# Patient Record
Sex: MALE | Race: WHITE | Employment: FULL TIME | URBAN - METROPOLITAN AREA
[De-identification: names, ages, dates, MRNs, and addresses within clinical notes are randomized per-mention and may not be internally consistent; named-entity substitution may affect disease eponyms.]

---

## 2018-08-10 ENCOUNTER — APPOINTMENT (OUTPATIENT)
Dept: GENERAL RADIOLOGY | Age: 28
End: 2018-08-10
Attending: STUDENT IN AN ORGANIZED HEALTH CARE EDUCATION/TRAINING PROGRAM
Payer: OTHER MISCELLANEOUS

## 2018-08-10 ENCOUNTER — HOSPITAL ENCOUNTER (EMERGENCY)
Age: 28
Discharge: HOME | End: 2018-08-10
Attending: STUDENT IN AN ORGANIZED HEALTH CARE EDUCATION/TRAINING PROGRAM
Payer: OTHER MISCELLANEOUS

## 2018-08-10 VITALS
BODY MASS INDEX: 30.51 KG/M2 | WEIGHT: 230.2 LBS | HEIGHT: 73 IN | OXYGEN SATURATION: 98 % | TEMPERATURE: 98 F | HEART RATE: 68 BPM | RESPIRATION RATE: 18 BRPM | DIASTOLIC BLOOD PRESSURE: 74 MMHG | SYSTOLIC BLOOD PRESSURE: 144 MMHG

## 2018-08-10 DIAGNOSIS — T30.4 BURNS BY, CHEMICAL: Primary | ICD-10-CM

## 2018-08-10 PROCEDURE — 99283 EMERGENCY DEPT VISIT LOW MDM: CPT | Performed by: STUDENT IN AN ORGANIZED HEALTH CARE EDUCATION/TRAINING PROGRAM

## 2018-08-10 PROCEDURE — 74011250637 HC RX REV CODE- 250/637: Performed by: STUDENT IN AN ORGANIZED HEALTH CARE EDUCATION/TRAINING PROGRAM

## 2018-08-10 PROCEDURE — 73090 X-RAY EXAM OF FOREARM: CPT

## 2018-08-10 PROCEDURE — 75810000057 HC BURN CR DRS/DEB SM<5%TBSA: Performed by: STUDENT IN AN ORGANIZED HEALTH CARE EDUCATION/TRAINING PROGRAM

## 2018-08-10 PROCEDURE — 73502 X-RAY EXAM HIP UNI 2-3 VIEWS: CPT

## 2018-08-10 PROCEDURE — 99284 EMERGENCY DEPT VISIT MOD MDM: CPT | Performed by: STUDENT IN AN ORGANIZED HEALTH CARE EDUCATION/TRAINING PROGRAM

## 2018-08-10 RX ORDER — HYDROCODONE BITARTRATE AND ACETAMINOPHEN 5; 325 MG/1; MG/1
1 TABLET ORAL
Qty: 10 TAB | Refills: 0 | Status: SHIPPED | OUTPATIENT
Start: 2018-08-10 | End: 2018-10-03

## 2018-08-10 RX ORDER — HYDROCODONE BITARTRATE AND ACETAMINOPHEN 7.5; 325 MG/1; MG/1
1 TABLET ORAL
Status: COMPLETED | OUTPATIENT
Start: 2018-08-10 | End: 2018-08-10

## 2018-08-10 RX ORDER — ONDANSETRON 2 MG/ML
4 INJECTION INTRAMUSCULAR; INTRAVENOUS
Status: DISCONTINUED | OUTPATIENT
Start: 2018-08-10 | End: 2018-08-10 | Stop reason: HOSPADM

## 2018-08-10 RX ORDER — SILVER SULFADIAZINE 10 G/1000G
CREAM TOPICAL
Status: DISCONTINUED | OUTPATIENT
Start: 2018-08-10 | End: 2018-08-10 | Stop reason: HOSPADM

## 2018-08-10 RX ORDER — HYDROMORPHONE HYDROCHLORIDE 2 MG/ML
1 INJECTION, SOLUTION INTRAMUSCULAR; INTRAVENOUS; SUBCUTANEOUS
Status: DISCONTINUED | OUTPATIENT
Start: 2018-08-10 | End: 2018-08-10

## 2018-08-10 RX ORDER — SILVER SULFADIAZINE 10 G/1000G
CREAM TOPICAL 2 TIMES DAILY
Qty: 50 G | Refills: 0 | Status: SHIPPED | OUTPATIENT
Start: 2018-08-10 | End: 2018-08-20

## 2018-08-10 RX ADMIN — HYDROCODONE BITARTRATE AND ACETAMINOPHEN 1 TABLET: 7.5; 325 TABLET ORAL at 05:50
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
## 2018-08-10 NOTE — ED NOTES
I have reviewed discharge instructions with the patient. The patient verbalized understanding. Patient left ED via Discharge Method: ambulatory to Home with (insert name of family/friend, self, transport girlfriend). Opportunity for questions and clarification provided. Patient given 2 scripts. To continue your aftercare when you leave the hospital, you may receive an automated call from our care team to check in on how you are doing. This is a free service and part of our promise to provide the best care and service to meet your aftercare needs.  If you have questions, or wish to unsubscribe from this service please call [phone]. Thank you for Choosing our [facility] Emergency Department.

## 2018-08-10 NOTE — DISCHARGE INSTRUCTIONS
Chemical Tam: Care Instructions  Your Care Instructions    Heidi Murray can occur when a harmful chemical,such as a cleaning product or an acid, splashes onto the skin. The amount of damage to the skin depends on how strong the chemical was, how much of it was on the skin, and how long it was there. Chemical burns, even minor ones, can be very painful. A minor burn may heal within a few days. But a more serious burn may take weeks or even months to heal completely. When the skin is damaged by a burn, it may become infected. You can help prevent infection and help your burn heal. Keep the burn clean, and change the bandages often. Taking good care of the burn as it heals may help prevent bad scars. The treatment for most chemical burns is to remove the chemical from the skin by flushing the area with plenty of water. But some chemicals can't be removed with water. They may need to be removed from the skin in other ways by the doctor. The doctor has checked your skin carefully, but problems can develop later. If you notice any problems or new symptoms, get medical treatment right away. Follow-up care is a key part of your treatment and safety. Be sure to make and go to all appointments, and call your doctor if you are having problems. It's also a good idea to know your test results and keep a list of the medicines you take. How can you care for yourself at home? · If your doctor told you how to care for your burn, follow your doctor's instructions. If you did not get instructions, follow this general advice:  ¨ Wash the burn with clean water 2 times a day. Don't use hydrogen peroxide or alcohol, which can slow healing. ¨ Gently pat the burn dry after you wash it. ¨ You may cover the burn with a thin layer of antibiotic ointment, such as bacitracin, and a nonstick bandage. ¨ Apply more ointment and replace the bandage as needed. · Be safe with medicines. Read and follow all instructions on the label.   ¨ If the doctor gave you a prescription medicine for pain, take it as prescribed. ¨ If you are not taking a prescription pain medicine, ask your doctor if you can take an over-the-counter medicine. · Don't break blisters open. Broken blisters could get infected. If a blister breaks open by itself, blot up the liquid, and leave the skin that covered the blister. This helps protect the new skin. · Try not to scratch the burn. Talk to your doctor about what to use on the burn for itching. When should you call for help? Call your doctor now or seek immediate medical care if:    · Your pain gets worse.     · You have symptoms of infection, such as:  ¨ Increased pain, swelling, warmth, or redness. ¨ Red streaks leading from the burn. ¨ Pus draining from the burn. ¨ A fever.    Watch closely for changes in your health, and be sure to contact your doctor if:    · The burn is not getting better each day. Where can you learn more? Go to http://leyda-wendy.info/. Enter M105 in the search box to learn more about \"Chemical Burns: Care Instructions. \"  Current as of: November 20, 2017  Content Version: 11.7  © 0673-2680 [employer], [employer]. Care instructions adapted under license by [employer] (which disclaims liability or warranty for this information). If you have questions about a medical condition or this instruction, always ask your healthcare professional. [place] [address] any warranty or liability for your use of this information.

## 2018-08-10 NOTE — ED NOTES
Calls to [employer] [employer] center patient does not want to go tonight.   Recommend irriagation

## 2018-08-10 NOTE — Clinical Note
Return in 24 hours for wound recheck and reevaluation. Arrange follow-up care as discussed with the specialists listed. Return sooner for worsening symptoms, concerns or questions.

## 2018-08-11 ENCOUNTER — HOSPITAL ENCOUNTER (EMERGENCY)
Age: 28
Discharge: HOME | End: 2018-08-11
Attending: EMERGENCY MEDICINE
Payer: OTHER MISCELLANEOUS

## 2018-08-11 VITALS
WEIGHT: 225 LBS | HEIGHT: 73 IN | BODY MASS INDEX: 29.82 KG/M2 | TEMPERATURE: 98.2 F | DIASTOLIC BLOOD PRESSURE: 97 MMHG | HEART RATE: 79 BPM | OXYGEN SATURATION: 99 % | RESPIRATION RATE: 16 BRPM | SYSTOLIC BLOOD PRESSURE: 146 MMHG

## 2018-08-11 DIAGNOSIS — T30.4 BURNS BY, CHEMICAL: Primary | ICD-10-CM

## 2018-08-11 PROCEDURE — 99281 EMR DPT VST MAYX REQ PHY/QHP: CPT | Performed by: NURSE PRACTITIONER

## 2018-08-11 RX ORDER — SILVER SULFADIAZINE 10 G/1000G
CREAM TOPICAL
Status: DISCONTINUED | OUTPATIENT
Start: 2018-08-11 | End: 2018-08-11 | Stop reason: HOSPADM
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
## 2018-08-11 NOTE — DISCHARGE INSTRUCTIONS
Chemical Tam: Care Instructions  Your Care Instructions    Twan Anchors can occur when a harmful chemical,such as a cleaning product or an acid, splashes onto the skin. The amount of damage to the skin depends on how strong the chemical was, how much of it was on the skin, and how long it was there. Chemical burns, even minor ones, can be very painful. A minor burn may heal within a few days. But a more serious burn may take weeks or even months to heal completely. When the skin is damaged by a burn, it may become infected. You can help prevent infection and help your burn heal. Keep the burn clean, and change the bandages often. Taking good care of the burn as it heals may help prevent bad scars. The treatment for most chemical burns is to remove the chemical from the skin by flushing the area with plenty of water. But some chemicals can't be removed with water. They may need to be removed from the skin in other ways by the doctor. The doctor has checked your skin carefully, but problems can develop later. If you notice any problems or new symptoms, get medical treatment right away. Follow-up care is a key part of your treatment and safety. Be sure to make and go to all appointments, and call your doctor if you are having problems. It's also a good idea to know your test results and keep a list of the medicines you take. How can you care for yourself at home? · If your doctor told you how to care for your burn, follow your doctor's instructions. If you did not get instructions, follow this general advice:  ¨ Wash the burn with clean water 2 times a day. Don't use hydrogen peroxide or alcohol, which can slow healing. ¨ Gently pat the burn dry after you wash it. ¨ You may cover the burn with a thin layer of antibiotic ointment, such as bacitracin, and a nonstick bandage. ¨ Apply more ointment and replace the bandage as needed. · Be safe with medicines. Read and follow all instructions on the label.   ¨ If the doctor gave you a prescription medicine for pain, take it as prescribed. ¨ If you are not taking a prescription pain medicine, ask your doctor if you can take an over-the-counter medicine. · Don't break blisters open. Broken blisters could get infected. If a blister breaks open by itself, blot up the liquid, and leave the skin that covered the blister. This helps protect the new skin. · Try not to scratch the burn. Talk to your doctor about what to use on the burn for itching. When should you call for help? Call your doctor now or seek immediate medical care if:    · Your pain gets worse.     · You have symptoms of infection, such as:  ¨ Increased pain, swelling, warmth, or redness. ¨ Red streaks leading from the burn. ¨ Pus draining from the burn. ¨ A fever.    Watch closely for changes in your health, and be sure to contact your doctor if:    · The burn is not getting better each day. Where can you learn more? Go to http://leyda-wendy.info/. Enter M105 in the search box to learn more about \"Chemical Burns: Care Instructions. \"  Current as of: November 20, 2017  Content Version: 11.7  © 1448-7402 [employer], [employer]. Care instructions adapted under license by [employer] (which disclaims liability or warranty for this information). If you have questions about a medical condition or this instruction, always ask your healthcare professional. [place] [address] any warranty or liability for your use of this information.

## 2022-05-31 ENCOUNTER — TELEPHONE (OUTPATIENT)
Dept: FAMILY MEDICINE CLINIC | Facility: CLINIC | Age: 32
End: 2022-05-31

## 2022-05-31 DIAGNOSIS — F32.9 REACTIVE DEPRESSION: Primary | ICD-10-CM

## 2022-05-31 RX ORDER — LAMOTRIGINE 150 MG/1
150 TABLET ORAL DAILY
Qty: 90 TABLET | Refills: 0 | Status: SHIPPED | OUTPATIENT
Start: 2022-05-31 | End: 2022-06-10 | Stop reason: SDUPTHER

## 2022-06-10 ENCOUNTER — OFFICE VISIT (OUTPATIENT)
Dept: FAMILY MEDICINE CLINIC | Facility: CLINIC | Age: 32
End: 2022-06-10
Payer: COMMERCIAL

## 2022-06-10 VITALS
HEART RATE: 59 BPM | OXYGEN SATURATION: 95 % | TEMPERATURE: 97.6 F | WEIGHT: 211 LBS | SYSTOLIC BLOOD PRESSURE: 122 MMHG | RESPIRATION RATE: 16 BRPM | HEIGHT: 73 IN | DIASTOLIC BLOOD PRESSURE: 80 MMHG | BODY MASS INDEX: 27.96 KG/M2

## 2022-06-10 DIAGNOSIS — F32.9 REACTIVE DEPRESSION: ICD-10-CM

## 2022-06-10 PROCEDURE — 99213 OFFICE O/P EST LOW 20 MIN: CPT | Performed by: NURSE PRACTITIONER

## 2022-06-10 RX ORDER — LAMOTRIGINE 150 MG/1
150 TABLET ORAL DAILY
Qty: 90 TABLET | Refills: 1 | Status: SHIPPED | OUTPATIENT
Start: 2022-06-10

## 2022-06-10 RX ORDER — SERTRALINE HYDROCHLORIDE 100 MG/1
200 TABLET, FILM COATED ORAL DAILY
Qty: 90 TABLET | Refills: 1 | Status: SHIPPED | OUTPATIENT
Start: 2022-06-10 | End: 2022-06-13 | Stop reason: SDUPTHER

## 2022-06-10 ASSESSMENT — PATIENT HEALTH QUESTIONNAIRE - PHQ9
9. THOUGHTS THAT YOU WOULD BE BETTER OFF DEAD, OR OF HURTING YOURSELF: 0
SUM OF ALL RESPONSES TO PHQ QUESTIONS 1-9: 1
7. TROUBLE CONCENTRATING ON THINGS, SUCH AS READING THE NEWSPAPER OR WATCHING TELEVISION: 0
5. POOR APPETITE OR OVEREATING: 0
10. IF YOU CHECKED OFF ANY PROBLEMS, HOW DIFFICULT HAVE THESE PROBLEMS MADE IT FOR YOU TO DO YOUR WORK, TAKE CARE OF THINGS AT HOME, OR GET ALONG WITH OTHER PEOPLE: 0
4. FEELING TIRED OR HAVING LITTLE ENERGY: 0
6. FEELING BAD ABOUT YOURSELF - OR THAT YOU ARE A FAILURE OR HAVE LET YOURSELF OR YOUR FAMILY DOWN: 0
8. MOVING OR SPEAKING SO SLOWLY THAT OTHER PEOPLE COULD HAVE NOTICED. OR THE OPPOSITE, BEING SO FIGETY OR RESTLESS THAT YOU HAVE BEEN MOVING AROUND A LOT MORE THAN USUAL: 0
SUM OF ALL RESPONSES TO PHQ QUESTIONS 1-9: 1
2. FEELING DOWN, DEPRESSED OR HOPELESS: 1
SUM OF ALL RESPONSES TO PHQ QUESTIONS 1-9: 1
SUM OF ALL RESPONSES TO PHQ QUESTIONS 1-9: 1

## 2022-06-10 ASSESSMENT — ENCOUNTER SYMPTOMS
BLOOD IN STOOL: 0
SHORTNESS OF BREATH: 0
CONSTIPATION: 0
RHINORRHEA: 0
COUGH: 0
WHEEZING: 0
DIARRHEA: 0
EYE DISCHARGE: 0
CHEST TIGHTNESS: 0
EYE PAIN: 0
VOMITING: 0
ABDOMINAL PAIN: 0

## 2022-06-10 NOTE — PATIENT INSTRUCTIONS
Patient Education        A Healthy Lifestyle: Care Instructions  Your Care Instructions     A healthy lifestyle can help you feel good, stay at a healthy weight, and have plenty of energy for both work and play. A healthy lifestyle is something youcan share with your whole family. A healthy lifestyle also can lower your risk for serious health problems, suchas high blood pressure, heart disease, and diabetes. You can follow a few steps listed below to improve your health and the healthof your family. Follow-up care is a key part of your treatment and safety. Be sure to make and go to all appointments, and call your doctor if you are having problems. It's also a good idea to know your test results and keep alist of the medicines you take. How can you care for yourself at home?  Do not eat too much sugar, fat, or fast foods. You can still have dessert and treats now and then. The goal is moderation.  Start small to improve your eating habits. Pay attention to portion sizes, drink less juice and soda pop, and eat more fruits and vegetables. ? Eat a healthy amount of food. A 3-ounce serving of meat, for example, is about the size of a deck of cards. Fill the rest of your plate with vegetables and whole grains. ? Limit the amount of soda and sports drinks you have every day. Drink more water when you are thirsty. ? Eat plenty of fruits and vegetables every day. Have an apple or some carrot sticks as an afternoon snack instead of a candy bar. Try to have fruits and/or vegetables at every meal.   Make exercise part of your daily routine. You may want to start with simple activities, such as walking, bicycling, or slow swimming. Try to be active 30 to 60 minutes every day. You do not need to do all 30 to 60 minutes all at once. For example, you can exercise 3 times a day for 10 or 20 minutes.  Moderate exercise is safe for most people, but it is always a good idea to talk to your doctor before starting an exercise program.   Keep moving. [place] the lawn, work in the garden, or Ferfics. Take the stairs instead of the elevator at work.  If you smoke, quit. People who smoke have an increased risk for heart attack, stroke, cancer, and other lung illnesses. Quitting is hard, but there are ways to boost your chance of quitting tobacco for good. ? Use nicotine gum, patches, or lozenges. ? Ask your doctor about stop-smoking programs and medicines. ? Keep trying. In addition to reducing your risk of diseases in the future, you will notice some benefits soon after you stop using tobacco. If you have shortness of breath or asthma symptoms, they will likely get better within a few weeks after you quit.  Limit how much alcohol you drink. Moderate amounts of alcohol (up to 2 drinks a day for men, 1 drink a day for women) are okay. But drinking too much can lead to liver problems, high blood pressure, and other health problems. Family health  If you have a family, there are many things you can do together to improve yourhealth.  Eat meals together as a family as often as possible.  Eat healthy foods. This includes fruits, vegetables, lean meats and dairy, and whole grains.  Include your family in your fitness plan. Most people think of activities such as jogging or tennis as the way to fitness, but there are many ways you and your family can be more active. Anything that makes you breathe hard and gets your heart pumping is exercise. Here are some tips:  ? Walk to do errands or to take your child to school or the bus.  ? Go for a family bike ride after dinner instead of watching TV. Where can you learn more? Go to https://Silent Circlemark.TransactionTree. org and sign in to your s0cket account. Enter L697 in the [facility] box to learn more about \"A Healthy Lifestyle: Care Instructions. \"     If you do not have an account, please click on the \"Sign Up Now\" link.   Current as of: June 16, 2021               Content Version: 13.2  © 6614-8324 [employer], [employer]. Care instructions adapted under license by [facility] ([facility]). If you have questions about a medical condition or this instruction, always ask your healthcare professional. [address] any warranty or liability for your use of this information.

## 2022-06-13 ENCOUNTER — TELEPHONE (OUTPATIENT)
Dept: FAMILY MEDICINE CLINIC | Facility: CLINIC | Age: 32
End: 2022-06-13

## 2022-06-13 DIAGNOSIS — F32.9 REACTIVE DEPRESSION: ICD-10-CM

## 2022-06-13 RX ORDER — SERTRALINE HYDROCHLORIDE 100 MG/1
200 TABLET, FILM COATED ORAL DAILY
Qty: 180 TABLET | Refills: 1 | Status: SHIPPED | OUTPATIENT
Start: 2022-06-13

## 2022-06-13 NOTE — TELEPHONE ENCOUNTER
[facility] called requesting clarification on Zoloft. She states script has two sets of directions, not sure if patient is to take 1 or 2 tabs a day. Requesting a new script to be sent.  Please advise

## 2022-07-07 ENCOUNTER — TELEPHONE (OUTPATIENT)
Dept: FAMILY MEDICINE CLINIC | Facility: CLINIC | Age: 32
End: 2022-07-07

## 2023-02-24 ENCOUNTER — TELEPHONE (OUTPATIENT)
Dept: FAMILY MEDICINE CLINIC | Facility: CLINIC | Age: 33
End: 2023-02-24

## 2023-02-24 DIAGNOSIS — F32.9 REACTIVE DEPRESSION: ICD-10-CM

## 2023-02-24 RX ORDER — SERTRALINE HYDROCHLORIDE 100 MG/1
200 TABLET, FILM COATED ORAL DAILY
Qty: 180 TABLET | Refills: 0 | Status: SHIPPED | OUTPATIENT
Start: 2023-02-24 | End: 2023-04-13 | Stop reason: SDUPTHER

## 2023-08-24 NOTE — PROGRESS NOTES
Marvel Sepulveda (:  1990) is a 34 y.o. male,Established patient, here for evaluation of the following chief complaint(s):  Medication Refill         ASSESSMENT/PLAN:  1. Reactive depression  -     lamoTRIgine (LAMICTAL) 150 MG tablet; Take 1 tablet by mouth daily, Disp-90 tablet, R-1Normal  -     sertraline (ZOLOFT) 100 MG tablet; Take 2 tablets by mouth daily TAKE ONE TABLET BY MOUTH ONE TIME DAILY, Disp-90 tablet, R-1Normal  -     External Referral to Psychiatry  Increased Zoloft to 200 mg per day. Monitor symptoms. Patient wishes to see counselor to discuss his issues and see if counseling will help him come off of his meds. Anticipatory guidance for age-seatbelts, avoid cell phone use in car (may use hands free), no texting while driving, avoid social drugs and tobacco, limit alcohol, fall safety, personal safety. Encourage healthy diet and exercise daily. Return in about 6 months (around 12/10/2022) for medication refills. Subjective   SUBJECTIVE/OBJECTIVE:  Is doing well on his medications. Was on Zoloft 200 mg but was decreased to 100 when the script was filled for one a day instead of 2. Feels the 200 mg dosage worked better for him. Was seeing a counselor thru Dr. Sy Watts office but was dismissed when he missed too many appointments. A new referral was placed last year but patient never received a call back from them. He wishes to do some counseling as he does not ant to be on medications forever. Feels his mood stabilizer is working well for him. Mental Health Problem  The primary symptoms include dysphoric mood. This is a chronic problem. The onset of the illness is precipitated by emotional stress. The degree of incapacity that he is experiencing as a consequence of his illness is moderate. Additional symptoms of the illness include feelings of worthlessness and distractible.  Additional symptoms of the illness do not include fatigue, headaches, atraumatic. Right Ear: Tympanic membrane, ear canal and external ear normal.      Left Ear: Tympanic membrane, ear canal and external ear normal.      Nose: Nose normal.      Mouth/Throat:      Mouth: Mucous membranes are moist.   Eyes:      Extraocular Movements: Extraocular movements intact. Conjunctiva/sclera: Conjunctivae normal.      Pupils: Pupils are equal, round, and reactive to light. Cardiovascular:      Rate and Rhythm: Normal rate and regular rhythm. Pulses: Normal pulses. Heart sounds: Normal heart sounds. No murmur heard. Pulmonary:      Effort: Pulmonary effort is normal.      Breath sounds: Normal breath sounds. Abdominal:      General: Bowel sounds are normal.   Musculoskeletal:         General: Normal range of motion. Cervical back: Normal range of motion. Skin:     General: Skin is warm and dry. Neurological:      General: No focal deficit present. Mental Status: He is alert and oriented to person, place, and time. Psychiatric:         Mood and Affect: Mood normal.         Behavior: Behavior normal.         Thought Content: Thought content normal.         Judgment: Judgment normal.           PHQ-9 Total Score: 1 (6/10/2022 11:16 AM)  Thoughts that you would be better off dead, or of hurting yourself in some way: 0 (6/10/2022 11:16 AM)        On this date 6/10/2022 I have spent 25 minutes reviewing previous notes, test results and face to face with the patient discussing the diagnosis and importance of compliance with the treatment plan as well as documenting on the day of the visit. An electronic signature was used to authenticate this note.     --MANJINDER Garibay - KALLI 24-Aug-2023 17:00

## 2023-11-13 DIAGNOSIS — F32.9 REACTIVE DEPRESSION: ICD-10-CM

## 2023-11-13 RX ORDER — LAMOTRIGINE 200 MG/1
200 TABLET ORAL DAILY
Qty: 90 TABLET | Refills: 0 | Status: SHIPPED | OUTPATIENT
Start: 2023-11-13 | End: 2023-11-30 | Stop reason: SDUPTHER

## 2023-11-13 NOTE — TELEPHONE ENCOUNTER
Pt called nad set an appt for 11/20 to get med refills but is requesting a refill on Lamictal until his appt.

## 2023-11-30 ENCOUNTER — OFFICE VISIT (OUTPATIENT)
Dept: FAMILY MEDICINE CLINIC | Facility: CLINIC | Age: 33
End: 2023-11-30
Payer: COMMERCIAL

## 2023-11-30 VITALS
WEIGHT: 232 LBS | HEART RATE: 62 BPM | RESPIRATION RATE: 18 BRPM | OXYGEN SATURATION: 97 % | TEMPERATURE: 97.2 F | HEIGHT: 73 IN | BODY MASS INDEX: 30.75 KG/M2 | SYSTOLIC BLOOD PRESSURE: 124 MMHG | DIASTOLIC BLOOD PRESSURE: 82 MMHG

## 2023-11-30 DIAGNOSIS — F32.9 REACTIVE DEPRESSION: ICD-10-CM

## 2023-11-30 PROCEDURE — 99213 OFFICE O/P EST LOW 20 MIN: CPT | Performed by: NURSE PRACTITIONER

## 2023-11-30 RX ORDER — SERTRALINE HYDROCHLORIDE 100 MG/1
200 TABLET, FILM COATED ORAL DAILY
Qty: 180 TABLET | Refills: 1 | Status: SHIPPED | OUTPATIENT
Start: 2023-11-30

## 2023-11-30 RX ORDER — LAMOTRIGINE 200 MG/1
200 TABLET ORAL DAILY
Qty: 90 TABLET | Refills: 1 | Status: SHIPPED | OUTPATIENT
Start: 2023-11-30

## 2023-11-30 SDOH — ECONOMIC STABILITY: INCOME INSECURITY: HOW HARD IS IT FOR YOU TO PAY FOR THE VERY BASICS LIKE FOOD, HOUSING, MEDICAL CARE, AND HEATING?: NOT HARD AT ALL

## 2023-11-30 SDOH — ECONOMIC STABILITY: FOOD INSECURITY: WITHIN THE PAST 12 MONTHS, THE FOOD YOU BOUGHT JUST DIDN'T LAST AND YOU DIDN'T HAVE MONEY TO GET MORE.: NEVER TRUE

## 2023-11-30 SDOH — ECONOMIC STABILITY: FOOD INSECURITY: WITHIN THE PAST 12 MONTHS, YOU WORRIED THAT YOUR FOOD WOULD RUN OUT BEFORE YOU GOT MONEY TO BUY MORE.: NEVER TRUE

## 2023-11-30 SDOH — ECONOMIC STABILITY: HOUSING INSECURITY
IN THE LAST 12 MONTHS, WAS THERE A TIME WHEN YOU DID NOT HAVE A STEADY PLACE TO SLEEP OR SLEPT IN A SHELTER (INCLUDING NOW)?: NO

## 2023-11-30 ASSESSMENT — ENCOUNTER SYMPTOMS
CONSTIPATION: 0
DIARRHEA: 0
ABDOMINAL PAIN: 0
CHEST TIGHTNESS: 0
RHINORRHEA: 0
SHORTNESS OF BREATH: 0
BLOOD IN STOOL: 0
COUGH: 0
EYE PAIN: 0
WHEEZING: 0
VOMITING: 0
EYE DISCHARGE: 0

## 2023-11-30 ASSESSMENT — PATIENT HEALTH QUESTIONNAIRE - PHQ9
2. FEELING DOWN, DEPRESSED OR HOPELESS: 0
SUM OF ALL RESPONSES TO PHQ9 QUESTIONS 1 & 2: 0
SUM OF ALL RESPONSES TO PHQ QUESTIONS 1-9: 0
1. LITTLE INTEREST OR PLEASURE IN DOING THINGS: 0

## 2024-07-08 ENCOUNTER — TELEPHONE (OUTPATIENT)
Dept: FAMILY MEDICINE CLINIC | Facility: CLINIC | Age: 34
End: 2024-07-08

## 2024-07-08 DIAGNOSIS — F32.9 REACTIVE DEPRESSION: ICD-10-CM

## 2024-07-08 RX ORDER — SERTRALINE HYDROCHLORIDE 100 MG/1
200 TABLET, FILM COATED ORAL DAILY
Qty: 180 TABLET | Refills: 0 | Status: SHIPPED | OUTPATIENT
Start: 2024-07-08

## 2024-09-04 ENCOUNTER — TELEPHONE (OUTPATIENT)
Dept: FAMILY MEDICINE CLINIC | Facility: CLINIC | Age: 34
End: 2024-09-04

## 2024-09-04 DIAGNOSIS — F32.9 REACTIVE DEPRESSION: ICD-10-CM

## 2024-09-04 RX ORDER — LAMOTRIGINE 200 MG/1
200 TABLET ORAL DAILY
Qty: 90 TABLET | Refills: 0 | Status: SHIPPED | OUTPATIENT
Start: 2024-09-04

## 2024-10-24 ENCOUNTER — TELEPHONE (OUTPATIENT)
Dept: FAMILY MEDICINE CLINIC | Facility: CLINIC | Age: 34
End: 2024-10-24

## 2024-10-24 DIAGNOSIS — F32.9 REACTIVE DEPRESSION: ICD-10-CM

## 2024-10-24 RX ORDER — SERTRALINE HYDROCHLORIDE 100 MG/1
200 TABLET, FILM COATED ORAL DAILY
Qty: 180 TABLET | Refills: 0 | Status: SHIPPED | OUTPATIENT
Start: 2024-10-24 | End: 2024-11-21 | Stop reason: SDUPTHER

## 2024-10-24 NOTE — TELEPHONE ENCOUNTER
A 90 day script for sertraline sent to [facility].  Has appointment scheduled for November 21.  Need to keep this appointment.  Kimberly Wheeler

## 2024-10-24 NOTE — TELEPHONE ENCOUNTER
Patient states needs refill on Sertraline sent to [facility], pt number is . States he has left messages regarding this though

## 2024-11-21 ENCOUNTER — OFFICE VISIT (OUTPATIENT)
Dept: FAMILY MEDICINE CLINIC | Facility: CLINIC | Age: 34
End: 2024-11-21
Payer: COMMERCIAL

## 2024-11-21 VITALS
DIASTOLIC BLOOD PRESSURE: 80 MMHG | HEIGHT: 73 IN | OXYGEN SATURATION: 97 % | RESPIRATION RATE: 18 BRPM | HEART RATE: 74 BPM | TEMPERATURE: 97.2 F | WEIGHT: 234 LBS | BODY MASS INDEX: 31.01 KG/M2 | SYSTOLIC BLOOD PRESSURE: 122 MMHG

## 2024-11-21 DIAGNOSIS — F32.9 REACTIVE DEPRESSION: ICD-10-CM

## 2024-11-21 PROCEDURE — 99213 OFFICE O/P EST LOW 20 MIN: CPT | Performed by: NURSE PRACTITIONER

## 2024-11-21 RX ORDER — SERTRALINE HYDROCHLORIDE 100 MG/1
200 TABLET, FILM COATED ORAL DAILY
Qty: 180 TABLET | Refills: 3 | Status: SHIPPED | OUTPATIENT
Start: 2024-11-21

## 2024-11-21 RX ORDER — LAMOTRIGINE 200 MG/1
200 TABLET ORAL DAILY
Qty: 90 TABLET | Refills: 3 | Status: SHIPPED | OUTPATIENT
Start: 2024-11-21

## 2024-11-21 SDOH — ECONOMIC STABILITY: FOOD INSECURITY: WITHIN THE PAST 12 MONTHS, THE FOOD YOU BOUGHT JUST DIDN'T LAST AND YOU DIDN'T HAVE MONEY TO GET MORE.: NEVER TRUE

## 2024-11-21 SDOH — ECONOMIC STABILITY: FOOD INSECURITY: WITHIN THE PAST 12 MONTHS, YOU WORRIED THAT YOUR FOOD WOULD RUN OUT BEFORE YOU GOT MONEY TO BUY MORE.: NEVER TRUE

## 2024-11-21 SDOH — ECONOMIC STABILITY: INCOME INSECURITY: HOW HARD IS IT FOR YOU TO PAY FOR THE VERY BASICS LIKE FOOD, HOUSING, MEDICAL CARE, AND HEATING?: NOT HARD AT ALL

## 2024-11-21 ASSESSMENT — ENCOUNTER SYMPTOMS
EYE DISCHARGE: 0
RHINORRHEA: 0
DIARRHEA: 0
EYE PAIN: 0
SHORTNESS OF BREATH: 0
CONSTIPATION: 0
VOMITING: 0
COUGH: 0
BLOOD IN STOOL: 0
CHEST TIGHTNESS: 0
ABDOMINAL PAIN: 0
WHEEZING: 0

## 2024-11-21 ASSESSMENT — PATIENT HEALTH QUESTIONNAIRE - PHQ9
SUM OF ALL RESPONSES TO PHQ QUESTIONS 1-9: 0
2. FEELING DOWN, DEPRESSED OR HOPELESS: NOT AT ALL
SUM OF ALL RESPONSES TO PHQ9 QUESTIONS 1 & 2: 0
1. LITTLE INTEREST OR PLEASURE IN DOING THINGS: NOT AT ALL

## 2025-05-15 ENCOUNTER — TELEPHONE (OUTPATIENT)
Dept: FAMILY MEDICINE CLINIC | Facility: CLINIC | Age: 35
End: 2025-05-15

## 2025-06-18 ENCOUNTER — OFFICE VISIT (OUTPATIENT)
Dept: FAMILY MEDICINE CLINIC | Facility: CLINIC | Age: 35
End: 2025-06-18
Payer: COMMERCIAL

## 2025-06-18 VITALS
HEIGHT: 73 IN | BODY MASS INDEX: 30.88 KG/M2 | TEMPERATURE: 97 F | DIASTOLIC BLOOD PRESSURE: 102 MMHG | RESPIRATION RATE: 18 BRPM | OXYGEN SATURATION: 98 % | SYSTOLIC BLOOD PRESSURE: 148 MMHG | WEIGHT: 233 LBS | HEART RATE: 68 BPM

## 2025-06-18 DIAGNOSIS — Z00.00 ROUTINE GENERAL MEDICAL EXAMINATION AT A HEALTH CARE FACILITY: Primary | ICD-10-CM

## 2025-06-18 DIAGNOSIS — F32.9 REACTIVE DEPRESSION: ICD-10-CM

## 2025-06-18 DIAGNOSIS — Z13.1 SCREENING FOR DIABETES MELLITUS: ICD-10-CM

## 2025-06-18 DIAGNOSIS — Z72.0 TOBACCO USE: ICD-10-CM

## 2025-06-18 DIAGNOSIS — Z00.00 ROUTINE GENERAL MEDICAL EXAMINATION AT A HEALTH CARE FACILITY: ICD-10-CM

## 2025-06-18 DIAGNOSIS — F41.9 ANXIETY: ICD-10-CM

## 2025-06-18 LAB
ALBUMIN SERPL-MCNC: 4.5 G/DL (ref 3.5–5)
ALBUMIN/GLOB SERPL: 1.4 (ref 1–1.9)
ALP SERPL-CCNC: 84 U/L (ref 40–129)
ALT SERPL-CCNC: 49 U/L (ref 8–55)
ANION GAP SERPL CALC-SCNC: 15 MMOL/L (ref 7–16)
AST SERPL-CCNC: 69 U/L (ref 15–37)
BASOPHILS # BLD: 0.03 K/UL (ref 0–0.2)
BASOPHILS NFR BLD: 0.6 % (ref 0–2)
BILIRUB SERPL-MCNC: 0.5 MG/DL (ref 0–1.2)
BUN SERPL-MCNC: 16 MG/DL (ref 6–23)
CALCIUM SERPL-MCNC: 9.4 MG/DL (ref 8.8–10.2)
CHLORIDE SERPL-SCNC: 99 MMOL/L (ref 98–107)
CHOLEST SERPL-MCNC: 379 MG/DL (ref 0–200)
CO2 SERPL-SCNC: 21 MMOL/L (ref 20–29)
CREAT SERPL-MCNC: 1.22 MG/DL (ref 0.8–1.3)
DIFFERENTIAL METHOD BLD: ABNORMAL
EOSINOPHIL # BLD: 0.07 K/UL (ref 0–0.8)
EOSINOPHIL NFR BLD: 1.5 % (ref 0.5–7.8)
ERYTHROCYTE [DISTWIDTH] IN BLOOD BY AUTOMATED COUNT: 13.4 % (ref 11.9–14.6)
EST. AVERAGE GLUCOSE BLD GHB EST-MCNC: 92 MG/DL
GLOBULIN SER CALC-MCNC: 3.2 G/DL (ref 2.3–3.5)
GLUCOSE SERPL-MCNC: 91 MG/DL (ref 70–99)
HBA1C MFR BLD: 4.8 % (ref 0–5.6)
HCT VFR BLD AUTO: 46 % (ref 41.1–50.3)
HDLC SERPL-MCNC: 40 MG/DL (ref 40–60)
HDLC SERPL: 9.4 (ref 0–5)
HGB BLD-MCNC: 16.4 G/DL (ref 13.6–17.2)
IMM GRANULOCYTES # BLD AUTO: 0.01 K/UL (ref 0–0.5)
IMM GRANULOCYTES NFR BLD AUTO: 0.2 % (ref 0–5)
LDLC SERPL CALC-MCNC: ABNORMAL MG/DL (ref 0–100)
LDLC SERPL DIRECT ASSAY-MCNC: 107 MG/DL (ref 0–100)
LYMPHOCYTES # BLD: 1.74 K/UL (ref 0.5–4.6)
LYMPHOCYTES NFR BLD: 36.9 % (ref 13–44)
MCH RBC QN AUTO: 33.7 PG (ref 26.1–32.9)
MCHC RBC AUTO-ENTMCNC: 35.7 G/DL (ref 31.4–35)
MCV RBC AUTO: 94.5 FL (ref 82–102)
MONOCYTES # BLD: 0.25 K/UL (ref 0.1–1.3)
MONOCYTES NFR BLD: 5.3 % (ref 4–12)
NEUTS SEG # BLD: 2.61 K/UL (ref 1.7–8.2)
NEUTS SEG NFR BLD: 55.5 % (ref 43–78)
NRBC # BLD: 0 K/UL (ref 0–0.2)
PLATELET # BLD AUTO: 242 K/UL (ref 150–450)
PMV BLD AUTO: 9 FL (ref 9.4–12.3)
POTASSIUM SERPL-SCNC: 4.3 MMOL/L (ref 3.5–5.1)
PROT SERPL-MCNC: 7.7 G/DL (ref 6.3–8.2)
RBC # BLD AUTO: 4.87 M/UL (ref 4.23–5.6)
SODIUM SERPL-SCNC: 134 MMOL/L (ref 136–145)
TRIGL SERPL-MCNC: 2311 MG/DL (ref 0–150)
TSH W FREE THYROID IF ABNORMAL: 3.4 UIU/ML (ref 0.27–4.2)
VLDLC SERPL CALC-MCNC: 462 MG/DL (ref 6–23)
WBC # BLD AUTO: 4.7 K/UL (ref 4.3–11.1)

## 2025-06-18 PROCEDURE — 99395 PREV VISIT EST AGE 18-39: CPT | Performed by: NURSE PRACTITIONER

## 2025-06-18 PROCEDURE — 99214 OFFICE O/P EST MOD 30 MIN: CPT | Performed by: NURSE PRACTITIONER

## 2025-06-18 RX ORDER — SERTRALINE HYDROCHLORIDE 100 MG/1
200 TABLET, FILM COATED ORAL DAILY
Qty: 180 TABLET | Refills: 3 | Status: SHIPPED | OUTPATIENT
Start: 2025-06-18

## 2025-06-18 RX ORDER — LAMOTRIGINE 200 MG/1
200 TABLET ORAL DAILY
Qty: 90 TABLET | Refills: 3 | Status: SHIPPED | OUTPATIENT
Start: 2025-06-18

## 2025-06-18 ASSESSMENT — ENCOUNTER SYMPTOMS
RESPIRATORY NEGATIVE: 1
GASTROINTESTINAL NEGATIVE: 1

## 2025-06-18 NOTE — ASSESSMENT & PLAN NOTE
Orders:    sertraline (ZOLOFT) 100 MG tablet; Take 2 tablets by mouth daily    lamoTRIgine (LAMICTAL) 200 MG tablet; Take 1 tablet by mouth daily

## 2025-06-18 NOTE — PROGRESS NOTES
2025    Rusty Bowden (:  1990) is a 35 y.o. male, here for a preventive medicine evaluation.    Subjective   Patient Active Problem List   Diagnosis    Anxiety     History of Present Illness  The patient is a 35-year-old male who presents for a physical and follow-up.    He reports that his blood pressure was within normal range during his last visit a few weeks ago. He has not undergone any blood work since . He has no known allergies. He reports no respiratory distress, chest pain, abdominal discomfort, hematochezia, changes in urinary stream, or hematuria. He maintains oral hygiene with annual dental cleanings and reports no ocular issues. He does not regularly consult an ophthalmologist.    He has been using chewing tobacco for the past 20 years, with intermittent periods of abstinence. He finds it challenging to quit due to its addictive nature. His alcohol consumption includes approximately 10 beers and 5 liquor drinks per week, primarily on weekends or during periods of work-related stress. He reports no use of illicit substances.    He is currently on a daily regimen of Lamictal 200 mg and Zoloft 200 mg, which he has been taking for the past 15 years to manage anxiety and stress.    Family history includes high blood pressure in both parents and prostate cancer in his grandfather at an older age.    SOCIAL HISTORY  He uses chewing tobacco and has been using it for 20 years. He drinks alcohol, consuming about 10 beers and 5 liquor drinks per week. He does not smoke or use vaping products. He does not use any illegal drugs.    FAMILY HISTORY  His mother and father have high blood pressure. His grandfather had prostate cancer at an older age. He is not aware of any family history of diabetes or colon cancer.        Review of Systems   Constitutional:  Negative for fatigue.   Respiratory: Negative.     Cardiovascular: Negative.    Gastrointestinal: Negative.    Neurological:

## 2025-06-19 ENCOUNTER — RESULTS FOLLOW-UP (OUTPATIENT)
Dept: FAMILY MEDICINE CLINIC | Facility: CLINIC | Age: 35
End: 2025-06-19

## 2025-06-19 DIAGNOSIS — E78.2 ELEVATED TRIGLYCERIDES WITH HIGH CHOLESTEROL: Primary | ICD-10-CM

## 2025-06-19 RX ORDER — ROSUVASTATIN CALCIUM 10 MG/1
10 TABLET, COATED ORAL NIGHTLY
Qty: 90 TABLET | Refills: 1 | Status: SHIPPED | OUTPATIENT
Start: 2025-06-19

## 2025-06-19 RX ORDER — FENOFIBRATE 145 MG/1
145 TABLET, FILM COATED ORAL DAILY
Qty: 90 TABLET | Refills: 1 | Status: SHIPPED | OUTPATIENT
Start: 2025-06-19